# Patient Record
Sex: MALE | Race: WHITE
[De-identification: names, ages, dates, MRNs, and addresses within clinical notes are randomized per-mention and may not be internally consistent; named-entity substitution may affect disease eponyms.]

---

## 2020-05-02 ENCOUNTER — HOSPITAL ENCOUNTER (EMERGENCY)
Dept: HOSPITAL 72 - EMR | Age: 20
Discharge: HOME | End: 2020-05-02
Payer: MEDICAID

## 2020-05-02 VITALS — SYSTOLIC BLOOD PRESSURE: 127 MMHG | DIASTOLIC BLOOD PRESSURE: 58 MMHG

## 2020-05-02 VITALS — HEIGHT: 72 IN | BODY MASS INDEX: 28.44 KG/M2 | WEIGHT: 210 LBS

## 2020-05-02 DIAGNOSIS — F12.90: ICD-10-CM

## 2020-05-02 DIAGNOSIS — J03.90: Primary | ICD-10-CM

## 2020-05-02 DIAGNOSIS — J02.0: ICD-10-CM

## 2020-05-02 PROCEDURE — 99282 EMERGENCY DEPT VISIT SF MDM: CPT

## 2020-05-02 NOTE — EMERGENCY ROOM REPORT
History of Present Illness


General


Chief Complaint:  Sore Throat


Source:  Patient





Present Illness


HPI


19-year-old male with no symptom medical history here complaining of 4 days of 

a 10 out of 10 sore throat and bilateral tonsillar swelling.  Denies any cough 

or congestion, shortness of breath, fever and chills, abdominal pain, nausea 

vomiting diarrhea.  Reports that he has been straining the whole time for the 

past several weeks.  Reports that he smokes marijuana on daily basis.  Reports 

that about few weeks ago he started feeling irritation in the throat however 

the pain started 4 days ago.  Has not taken medication for symptom relief.  

Sitting comfortably with stable vital signs, oxygenation and temperature within 

normal limits.


Allergies:  


Coded Allergies:  


     No Known Allergies (Unverified , 5/2/20)





COVID-19 Screening


Contact w/high risk pt:  No


Recent Travel to affected area:  No


Experienced COVID-19 symptoms?:  No





Patient History


Past Medical History:  see triage record


Past Surgical History:  none


Pertinent Family History:  none


Social History:  Reports: drug use - marijuana


Immunizations:  UTD


Reviewed Nursing Documentation:  PMH: Agreed; PSxH: Agreed





Nursing Documentation-PMH


Past Medical History:  No History, Except For


Hx Asthma:  Yes





Review of Systems


All Other Systems:  negative except mentioned in HPI





Physical Exam





Vital Signs








  Date Time  Temp Pulse Resp B/P (MAP) Pulse Ox O2 Delivery O2 Flow Rate FiO2


 


5/2/20 14:13 98.1 75 18 127/58 (81) 99 Room Air  








Sp02 EP Interpretation:  reviewed, normal


General Appearance:  no apparent distress, alert, GCS 15, non-toxic


Head:  normocephalic, atraumatic


Eyes:  bilateral eye normal inspection, bilateral eye PERRL


ENT:  tonsillar swelling, pharyngeal erythema, tonsillar exudate


Neck:  full range of motion, supple/symm/no masses


Respiratory:  lungs clear


Cardiovascular #1:  regular rate, rhythm, no edema


Gastrointestinal:  normal bowel sounds, non tender, soft, non-distended, no 

guarding, no rebound


Genitourinary:  no CVA tenderness


Musculoskeletal:  back normal


Neurologic:  alert, motor strength/tone normal, oriented x3, sensory intact, 

responsive, speech normal


Psychiatric:  judgement/insight normal, memory normal, mood/affect normal, no 

suicidal/homicidal ideation


Skin:  no rash


Lymphatic:  normal inspection





Medical Decision Making


PA Attestation


All diagnoses and treatment plans were reviewed and discussed with my 

supervising physician Dr. Goetz


Diagnostic Impression:  


 Primary Impression:  


 Tonsillitis with exudate


 Additional Impression:  


 Strep pharyngitis


ER Course


19-year-old male with no symptom medical history here complaining of 4 days of 

a 10 out of 10 sore throat and bilateral tonsillar swelling.  Denies any cough 

or congestion, shortness of breath, fever and chills, abdominal pain, nausea 

vomiting diarrhea.  Reports that he has been straining the whole time for the 

past several weeks.  Reports that he smokes marijuana on daily basis.  Reports 

that about few weeks ago he started feeling irritation in the throat however 

the pain started 4 days ago.  Has not taken medication for symptom relief.  

Sitting comfortably with stable vital signs, oxygenation and temperature within 

normal limits.





Ddx considered but are not limited to: strep pharyngitis, URI, tonsillitis, 

peritonsillar abscess, influenza














Vital signs: are WNL, pt. is afebrile








 H&PE are most consistent with: Strep pharyngitis, tonsillitis with exudate














ORDERS: Augmentin, prednisone











ED INTERVENTIONS: None required at this time.























DISCHARGE: At this time pt. is stable for d/c to home. Will provide printed 

patient care instructions, and any necessary prescriptions. Care plan and 

follow up instructions have been discussed with the patient prior to discharge.

  Take medication as directed, follow primary doctor, if worsening symptoms 

return to the emergency room





Last Vital Signs








  Date Time  Temp Pulse Resp B/P (MAP) Pulse Ox O2 Delivery O2 Flow Rate FiO2


 


5/2/20 14:13 98.1 75 18 127/58 (81) 99 Room Air  








Disposition:  HOME, SELF-CARE


Condition:  Stable


Scripts


Prednisone* (PREDNISONE*) 20 Mg Tablet


40 MG ORAL DAILY for 5 Days, #10 TAB


   Prov: Joselin Franco         5/2/20 


Amoxicillin/Potassium Clav 875-125* (AUGMENTIN 875-125 TABLET*) 1 Each Tablet


1 TAB ORAL TWICE A DAY for 10 Days, #20 TAB


   Prov: Joselin Franco         5/2/20


Patient Instructions:  Strep Throat, Tonsillitis





Additional Instructions:  


Take medication as directed, follow-up with your primary doctor, avoid drinking 

alcohol while taking prednisone, if worsening symptoms return to the emergency 

room











Joselin Franco May 2, 2020 14:32